# Patient Record
Sex: MALE | Race: WHITE | NOT HISPANIC OR LATINO | URBAN - METROPOLITAN AREA
[De-identification: names, ages, dates, MRNs, and addresses within clinical notes are randomized per-mention and may not be internally consistent; named-entity substitution may affect disease eponyms.]

---

## 2017-04-07 ENCOUNTER — EMERGENCY (EMERGENCY)
Facility: HOSPITAL | Age: 22
LOS: 1 days | Discharge: PSYCHIATRIC OR SUBSTANCE USE FACILITY | End: 2017-04-07
Attending: EMERGENCY MEDICINE | Admitting: EMERGENCY MEDICINE
Payer: COMMERCIAL

## 2017-04-07 ENCOUNTER — INPATIENT (INPATIENT)
Facility: HOSPITAL | Age: 22
LOS: 5 days | Discharge: HOME | End: 2017-04-13
Attending: PSYCHIATRY & NEUROLOGY | Admitting: STUDENT IN AN ORGANIZED HEALTH CARE EDUCATION/TRAINING PROGRAM
Payer: COMMERCIAL

## 2017-04-07 VITALS
OXYGEN SATURATION: 96 % | TEMPERATURE: 98 F | RESPIRATION RATE: 15 BRPM | DIASTOLIC BLOOD PRESSURE: 70 MMHG | WEIGHT: 175.05 LBS | SYSTOLIC BLOOD PRESSURE: 119 MMHG | HEART RATE: 89 BPM | HEIGHT: 69 IN

## 2017-04-07 VITALS
DIASTOLIC BLOOD PRESSURE: 68 MMHG | OXYGEN SATURATION: 98 % | RESPIRATION RATE: 16 BRPM | TEMPERATURE: 99 F | HEART RATE: 100 BPM | SYSTOLIC BLOOD PRESSURE: 125 MMHG

## 2017-04-07 VITALS — RESPIRATION RATE: 16 BRPM | HEIGHT: 68 IN | WEIGHT: 171.3 LBS | TEMPERATURE: 99 F

## 2017-04-07 DIAGNOSIS — S60.812A ABRASION OF LEFT WRIST, INITIAL ENCOUNTER: ICD-10-CM
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
## 2017-04-07 DIAGNOSIS — T14.91 SUICIDE ATTEMPT: ICD-10-CM

## 2017-04-07 DIAGNOSIS — F33.2 MAJOR DEPRESSIVE DISORDER, RECURRENT SEVERE WITHOUT PSYCHOTIC FEATURES: ICD-10-CM

## 2017-04-07 DIAGNOSIS — R00.0 TACHYCARDIA, UNSPECIFIED: ICD-10-CM

## 2017-04-07 DIAGNOSIS — S60.811A ABRASION OF RIGHT WRIST, INITIAL ENCOUNTER: ICD-10-CM

## 2017-04-07 DIAGNOSIS — F19.10 OTHER PSYCHOACTIVE SUBSTANCE ABUSE, UNCOMPLICATED: ICD-10-CM
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
## 2017-04-07 DIAGNOSIS — F17.210 NICOTINE DEPENDENCE, CIGARETTES, UNCOMPLICATED: ICD-10-CM
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
## 2017-04-07 DIAGNOSIS — Z79.899 OTHER LONG TERM (CURRENT) DRUG THERAPY: ICD-10-CM

## 2017-04-07 LAB
APAP SERPL-MCNC: <15 UG/ML — SIGNIFICANT CHANGE UP (ref 10–30)
APPEARANCE UR: CLEAR — SIGNIFICANT CHANGE UP
BASOPHILS # BLD AUTO: 0.1 K/UL — SIGNIFICANT CHANGE UP (ref 0–0.2)
BASOPHILS NFR BLD AUTO: 0.5 % — SIGNIFICANT CHANGE UP (ref 0–2)
BILIRUB UR-MCNC: NEGATIVE — SIGNIFICANT CHANGE UP
COLOR SPEC: YELLOW — SIGNIFICANT CHANGE UP
DIFF PNL FLD: ABNORMAL
EOSINOPHIL # BLD AUTO: 0.1 K/UL — SIGNIFICANT CHANGE UP (ref 0–0.5)
EOSINOPHIL NFR BLD AUTO: 1.2 % — SIGNIFICANT CHANGE UP (ref 0–6)
ETHANOL SERPL-MCNC: 42 MG/DL — HIGH (ref 0–10)
GAS PNL BLDV: SIGNIFICANT CHANGE UP
GLUCOSE UR QL: NEGATIVE — SIGNIFICANT CHANGE UP
HCT VFR BLD CALC: 41.3 % — SIGNIFICANT CHANGE UP (ref 39–50)
HGB BLD-MCNC: 13.8 G/DL — SIGNIFICANT CHANGE UP (ref 13–17)
KETONES UR-MCNC: ABNORMAL
LEUKOCYTE ESTERASE UR-ACNC: NEGATIVE — SIGNIFICANT CHANGE UP
LYMPHOCYTES # BLD AUTO: 28.6 % — SIGNIFICANT CHANGE UP (ref 13–44)
LYMPHOCYTES # BLD AUTO: 3.4 K/UL — HIGH (ref 1–3.3)
MCHC RBC-ENTMCNC: 29.4 PG — SIGNIFICANT CHANGE UP (ref 27–34)
MCHC RBC-ENTMCNC: 33.5 GM/DL — SIGNIFICANT CHANGE UP (ref 32–36)
MCV RBC AUTO: 87.6 FL — SIGNIFICANT CHANGE UP (ref 80–100)
MONOCYTES # BLD AUTO: 1 K/UL — HIGH (ref 0–0.9)
MONOCYTES NFR BLD AUTO: 8.6 % — SIGNIFICANT CHANGE UP (ref 2–14)
NEUTROPHILS # BLD AUTO: 7.3 K/UL — SIGNIFICANT CHANGE UP (ref 1.8–7.4)
NEUTROPHILS NFR BLD AUTO: 61.1 % — SIGNIFICANT CHANGE UP (ref 43–77)
NITRITE UR-MCNC: NEGATIVE — SIGNIFICANT CHANGE UP
PCP SPEC-MCNC: SIGNIFICANT CHANGE UP
PH UR: 6 — SIGNIFICANT CHANGE UP (ref 4.8–8)
PLATELET # BLD AUTO: 245 K/UL — SIGNIFICANT CHANGE UP (ref 150–400)
PROT UR-MCNC: SIGNIFICANT CHANGE UP
RBC # BLD: 4.72 M/UL — SIGNIFICANT CHANGE UP (ref 4.2–5.8)
RBC # FLD: 13.4 % — SIGNIFICANT CHANGE UP (ref 10.3–14.5)
SALICYLATES SERPL-MCNC: <2 MG/DL — LOW (ref 15–30)
SP GR SPEC: 1.03 — HIGH (ref 1.01–1.02)
TSH SERPL-MCNC: 1.73 UIU/ML — SIGNIFICANT CHANGE UP (ref 0.27–4.2)
UROBILINOGEN FLD QL: NEGATIVE — SIGNIFICANT CHANGE UP
WBC # BLD: 12 K/UL — HIGH (ref 3.8–10.5)
WBC # FLD AUTO: 12 K/UL — HIGH (ref 3.8–10.5)

## 2017-04-07 PROCEDURE — 93010 ELECTROCARDIOGRAM REPORT: CPT | Mod: NC

## 2017-04-07 PROCEDURE — 99285 EMERGENCY DEPT VISIT HI MDM: CPT | Mod: 25

## 2017-04-07 PROCEDURE — 82947 ASSAY GLUCOSE BLOOD QUANT: CPT

## 2017-04-07 PROCEDURE — 85027 COMPLETE CBC AUTOMATED: CPT

## 2017-04-07 PROCEDURE — 93005 ELECTROCARDIOGRAM TRACING: CPT

## 2017-04-07 PROCEDURE — 84295 ASSAY OF SERUM SODIUM: CPT

## 2017-04-07 PROCEDURE — 80307 DRUG TEST PRSMV CHEM ANLYZR: CPT

## 2017-04-07 PROCEDURE — 82435 ASSAY OF BLOOD CHLORIDE: CPT

## 2017-04-07 PROCEDURE — 84443 ASSAY THYROID STIM HORMONE: CPT

## 2017-04-07 PROCEDURE — 84132 ASSAY OF SERUM POTASSIUM: CPT

## 2017-04-07 PROCEDURE — 81001 URINALYSIS AUTO W/SCOPE: CPT

## 2017-04-07 PROCEDURE — 83605 ASSAY OF LACTIC ACID: CPT

## 2017-04-07 PROCEDURE — 85014 HEMATOCRIT: CPT

## 2017-04-07 PROCEDURE — 82330 ASSAY OF CALCIUM: CPT

## 2017-04-07 PROCEDURE — 99222 1ST HOSP IP/OBS MODERATE 55: CPT

## 2017-04-07 PROCEDURE — 82803 BLOOD GASES ANY COMBINATION: CPT

## 2017-04-07 PROCEDURE — 99285 EMERGENCY DEPT VISIT HI MDM: CPT

## 2017-04-07 RX ORDER — MYCOPHENOLATE MOFETIL 250 MG/1
500 CAPSULE ORAL EVERY 12 HOURS
Qty: 0 | Refills: 0 | Status: DISCONTINUED | OUTPATIENT
Start: 2017-04-07 | End: 2017-04-07

## 2017-04-07 RX ORDER — HYDROXYZINE HCL 10 MG
50 TABLET ORAL EVERY 6 HOURS
Qty: 0 | Refills: 0 | Status: DISCONTINUED | OUTPATIENT
Start: 2017-04-07 | End: 2017-04-13

## 2017-04-07 RX ORDER — DIPHENHYDRAMINE HCL 50 MG
50 CAPSULE ORAL ONCE
Qty: 0 | Refills: 0 | Status: DISCONTINUED | OUTPATIENT
Start: 2017-04-07 | End: 2017-04-13

## 2017-04-07 RX ORDER — ALPRAZOLAM 0.25 MG
0 TABLET ORAL
Qty: 0 | Refills: 0 | COMMUNITY

## 2017-04-07 RX ORDER — ACETAMINOPHEN 500 MG
1000 TABLET ORAL ONCE
Qty: 0 | Refills: 0 | Status: DISCONTINUED | OUTPATIENT
Start: 2017-04-07 | End: 2017-04-07

## 2017-04-07 RX ORDER — THIAMINE MONONITRATE (VIT B1) 100 MG
100 TABLET ORAL DAILY
Qty: 0 | Refills: 0 | Status: COMPLETED | OUTPATIENT
Start: 2017-04-07 | End: 2017-04-10

## 2017-04-07 RX ORDER — DIPHENHYDRAMINE HCL 50 MG
50 CAPSULE ORAL EVERY 6 HOURS
Qty: 0 | Refills: 0 | Status: DISCONTINUED | OUTPATIENT
Start: 2017-04-07 | End: 2017-04-13

## 2017-04-07 RX ORDER — FOLIC ACID 0.8 MG
1 TABLET ORAL DAILY
Qty: 0 | Refills: 0 | Status: DISCONTINUED | OUTPATIENT
Start: 2017-04-07 | End: 2017-04-13

## 2017-04-07 RX ORDER — HALOPERIDOL DECANOATE 100 MG/ML
5 INJECTION INTRAMUSCULAR ONCE
Qty: 0 | Refills: 0 | Status: DISCONTINUED | OUTPATIENT
Start: 2017-04-07 | End: 2017-04-13

## 2017-04-07 RX ORDER — DIPHENHYDRAMINE HCL 50 MG
50 CAPSULE ORAL AT BEDTIME
Qty: 0 | Refills: 0 | Status: DISCONTINUED | OUTPATIENT
Start: 2017-04-07 | End: 2017-04-13

## 2017-04-07 RX ORDER — SERTRALINE 25 MG/1
100 TABLET, FILM COATED ORAL DAILY
Qty: 0 | Refills: 0 | Status: DISCONTINUED | OUTPATIENT
Start: 2017-04-07 | End: 2017-04-10

## 2017-04-07 RX ORDER — HALOPERIDOL DECANOATE 100 MG/ML
5 INJECTION INTRAMUSCULAR EVERY 6 HOURS
Qty: 0 | Refills: 0 | Status: DISCONTINUED | OUTPATIENT
Start: 2017-04-07 | End: 2017-04-13

## 2017-04-07 RX ADMIN — Medication 50 MILLIGRAM(S): at 21:35

## 2017-04-07 NOTE — ED BEHAVIORAL HEALTH ASSESSMENT NOTE - DETAILS
p/w SA by cutting while alone in a hotel room; also with h/o SA by hanging and OD father with bipolar disorder and substance abuse; maternal uncle with heroin abuse and completed suicide see above hears a man's voice telling him to kill himself repeatedly "when very depressed" per pt; mother has not seen him internally preoccupied, however when bed identified mother aware

## 2017-04-07 NOTE — ED PROVIDER NOTE - OBJECTIVE STATEMENT
21 YOM PMH polysubstance abuse, multiple suicide attempts, no evaluations for past attempts, depression/anxiety on Zoloft an Xanax, no past psychiatric hospitalizations p/w depression after a suicide attempt last night.  Pt attempted to slit his wrists last night with a switch blade and was unsuccessful.  He complains of superficial abrasions on his wrists bilaterally. Mom picked him up from [place] yesterday.  Mom states PT attempted  to hang himself in the past has never been hospitalized.  Pt endorses depression, SI, access to weapons, plan, and intent.  No other complaints.  Pt endorses cocaine, marijuana, amphetamine, ETOH, and polysubstance abuse, "I use everything."  Pt took a  Xanax today.  Denies other ingestions.

## 2017-04-07 NOTE — ED BEHAVIORAL HEALTH ASSESSMENT NOTE - HPI (INCLUDE ILLNESS QUALITY, SEVERITY, DURATION, TIMING, CONTEXT, MODIFYING FACTORS, ASSOCIATED SIGNS AND SYMPTOMS)
21M single, childless, college freshman on medical FATEMEH, currently living with parents, with a history of depressive d/o, anxiety d/o, and polysubstance abuse, no prior psych admissions, at least 2 prior SA by hanging and OD, currently in OP psych tx on Zoloft, actively abusing ETOH, Xanax, and MJ, no h/o w/d, DTs, rehab, no active legal problems, no relevant PMH bib mother after SA last night by cutting.    Pt states he has been depressed as long as he remembers since age 12-13.  States he was formally diagnosed 4 months ago with depression and anxiety by an OP psychiatrist, started on Zoloft.  States he has remained significantly depressed over several months, however, with low mood, anhedonia, amotivation, and poor sleep.  This culminated in pt taking medical FATEMEH from [employer] 2 months ago and returning to  to live with parents.  States he has contemplated suicide recurrently, and this weekend took a trip to [place] to see his friends and saw an opportunity to end his life while alone in the motel room.  He cut his wrists vertically with intent to die, but then called his mother when he could not cut deep enough who drove overnight and immediately brought him to ER.  States he still wishes he was dead, but would not want to kill himself at home where his mother can find his body.  States he also hears a man's voice which tells him to kill himself, but only when he is "very depressed" and under stress.  Additionally, has been abusing Xanax, ETOH and MJ with binge pattern 3-4 times per week.  Denies H/o w/d, DT, rehab/detox.  Has very slight tremor at fingertips presently, VSS.  No otoniel/HIIP.  Significant anxiety sx reported, uses Xanax as PRN but also abuses.  Denies access to guns.     Collateral obtained from mother Jazmyne Antony who confirms above hx, feels pt is a definite threat to self for future suicide attempts if he does not get help.  States she does not think his medication is working, despite recent increase in dose to Zoloft 75mg PO daily (psychoed provided regarding dosing).  States he sleeps in bed all day typically, has been very depressed and anxious.  Believes he requires admission for safety.

## 2017-04-07 NOTE — ED BEHAVIORAL HEALTH ASSESSMENT NOTE - OTHER PAST PSYCHIATRIC HISTORY (INCLUDE DETAILS REGARDING ONSET, COURSE OF ILLNESS, INPATIENT/OUTPATIENT TREATMENT)
no prior admissions, 2 prior SA by hanging and OD, in tx with psychiatrist Dr. Clark, on Zoloft/Xanax PRN

## 2017-04-07 NOTE — ED ADULT NURSE REASSESSMENT NOTE - NS ED NURSE REASSESS COMMENT FT1
patient transferred via ambulance, vital signs stable, lung field clear. safety and support provided.

## 2017-04-07 NOTE — ED BEHAVIORAL HEALTH ASSESSMENT NOTE - RISK ASSESSMENT
Risk factors- recent SA, current SI, polysubstance, fam hx of completed suicide, decline in functioniing (left school), mood episode, ?command hallucinations    Protective factors- no guns, supportive family, good physical health    Pt is at acutely elevated risk, requires admssion for safety.

## 2017-04-07 NOTE — ED BEHAVIORAL HEALTH ASSESSMENT NOTE - SUICIDE RISK FACTORS
Hopelessness/Mood episode/Substance abuse/dependence/Unable to engage in safety planning/Global insomnia/Anhedonia/Command hallucinations to hurt self/Family history of suicide/Agitation/severe anxiety

## 2017-04-07 NOTE — ED BEHAVIORAL HEALTH ASSESSMENT NOTE - DESCRIPTION (FIRST USE, LAST USE, QUANTITY, FREQUENCY, DURATION)
binge drinking 2-3x/week, no h/o w/d, DT or rehab abuse several times a week Xanax abuse several times a week

## 2017-04-07 NOTE — ED ADULT NURSE NOTE - OBJECTIVE STATEMENT
22 Y/O AXOX4 male present in the ED with mom with chief complaint of cutting bilateral wrist with a knife with attempt of killing self .  Abrasion, strong pulses, no edema, full sensation noted in both wrist. Patient stated  " I just wanted to end it , the voice in my head are telling me to do it".  also patient stated of having previous suicidal attempt of hanging, and overdosing  with pills. At this time patient is on 1:1 OBS for safety. Capillary refill less then 3 seconds, mucous membrane moist, respiration even unlabored, lung field clear bilaterally. abdomen soft nontender, nondistended. normoactive bowel sound x4. Education provided to patient in using positive coping skills to cope with feelings. plan of care explained to patient. safety, support provided to patient.

## 2017-04-07 NOTE — ED PROVIDER NOTE - PROGRESS NOTE DETAILS
JW PT endorses attempting to hang himself recently. PT endorses SI last night with attempt to slit his wrists. PT endorses depression.  Pt endorses SI, plan, intent, access to weapons.  In spite of this  PT expresses a strong desire to return home. Explained the need for hospitalization and the risks of discharge given his suicide risk.  Pt seen by psychiatry and they agree. Pt understood and continued to refuse hospitalization today.  Given the clear and immediate danger the patient presents to himself today, patient involuntary hospitalized.  Two provider certification completed. JW Pending transfer, stable. Transferred to [facility].  Medically stabilized.

## 2017-04-07 NOTE — ED BEHAVIORAL HEALTH ASSESSMENT NOTE - PSYCHIATRIC ISSUES AND PLAN (INCLUDE STANDING AND PRN MEDICATION)
Increase Zoloft to 125mg PO daily.  Atarax 25mg PO q6h PRN anxiety.  Haldol 5mg/Benadryl 50mg PO/IM q6h PRN agitation

## 2017-04-07 NOTE — ED BEHAVIORAL HEALTH ASSESSMENT NOTE - DESCRIPTION
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

## 2017-04-07 NOTE — ED BEHAVIORAL HEALTH ASSESSMENT NOTE - SUMMARY
21M single, childless, college freshman on medical FATEMEH, currently living with parents, with a history of depressive d/o, anxiety d/o, and polysubstance abuse, no prior psych admissions, at least 2 prior SA by hanging and OD, currently in OP psych tx on Zoloft, actively abusing ETOH, Xanax, and MJ, no h/o w/d, DTs, rehab, no active legal problems, no relevant PMH bib mother after SA last night by cutting.  Pt reports he had definite intent to die with ongoing SI, inadequate response to treatment (on low dose SSRI), on medical leave from college for depression.  Family very concerned for safety.  Requires psychiatric admission for stabilization.

## 2017-04-07 NOTE — ED PROVIDER NOTE - MEDICAL DECISION MAKING DETAILS
21 YOM PMH polysubstance abuse, multiple suicide attempts, no evaluations for past attempts, depression/anxiety on Zoloft an Xanax, no past psychiatric hospitalizations p/w depression after a suicide attempt last night.  Tachycardia.  EKG WNL.  Evaluate for suicidal ideation, attempt, and possible ingestion.  Treat symptomatically.  Psychiatry consult.  Admit. 21 YOM PMH polysubstance abuse, multiple suicide attempts, no evaluations for past attempts, depression/anxiety on Zoloft an Xanax, no past psychiatric hospitalizations p/w depression after a suicide attempt last night.  Tachycardia.  EKG WNL.  Evaluate for suicidal ideation, attempt, and possible ingestion.  Treat symptomatically.  Psychiatry consult.  Admit.  --MORAIMAM

## 2017-04-08 LAB
ALBUMIN SERPL ELPH-MCNC: 4.5 G/DL — SIGNIFICANT CHANGE UP (ref 3.3–5)
ALP SERPL-CCNC: 90 U/L — SIGNIFICANT CHANGE UP (ref 60–270)
ALT FLD-CCNC: 72 U/L — HIGH (ref 4–41)
AST SERPL-CCNC: 57 U/L — HIGH (ref 4–40)
BILIRUB SERPL-MCNC: 0.6 MG/DL — SIGNIFICANT CHANGE UP (ref 0.2–1.2)
BUN SERPL-MCNC: 20 MG/DL — SIGNIFICANT CHANGE UP (ref 7–23)
CALCIUM SERPL-MCNC: 9.7 MG/DL — SIGNIFICANT CHANGE UP (ref 8.4–10.5)
CHLORIDE SERPL-SCNC: 102 MMOL/L — SIGNIFICANT CHANGE UP (ref 98–107)
CO2 SERPL-SCNC: 24 MMOL/L — SIGNIFICANT CHANGE UP (ref 22–31)
CREAT SERPL-MCNC: 1.08 MG/DL — SIGNIFICANT CHANGE UP (ref 0.5–1.3)
GLUCOSE SERPL-MCNC: 72 MG/DL — SIGNIFICANT CHANGE UP (ref 70–99)
POTASSIUM SERPL-MCNC: 4.2 MMOL/L — SIGNIFICANT CHANGE UP (ref 3.5–5.3)
POTASSIUM SERPL-SCNC: 4.2 MMOL/L — SIGNIFICANT CHANGE UP (ref 3.5–5.3)
PROT SERPL-MCNC: 7.4 G/DL — SIGNIFICANT CHANGE UP (ref 6–8.3)
SODIUM SERPL-SCNC: 144 MMOL/L — SIGNIFICANT CHANGE UP (ref 135–145)

## 2017-04-08 PROCEDURE — 99232 SBSQ HOSP IP/OBS MODERATE 35: CPT

## 2017-04-08 RX ORDER — NICOTINE POLACRILEX 2 MG
2 GUM BUCCAL
Qty: 0 | Refills: 0 | Status: DISCONTINUED | OUTPATIENT
Start: 2017-04-08 | End: 2017-04-13

## 2017-04-08 RX ADMIN — Medication 100 MILLIGRAM(S): at 09:01

## 2017-04-08 RX ADMIN — SERTRALINE 100 MILLIGRAM(S): 25 TABLET, FILM COATED ORAL at 09:01

## 2017-04-08 RX ADMIN — Medication 1 TABLET(S): at 09:01

## 2017-04-08 RX ADMIN — Medication 50 MILLIGRAM(S): at 21:52

## 2017-04-08 RX ADMIN — Medication 1 MILLIGRAM(S): at 09:01

## 2017-04-08 RX ADMIN — Medication 2 MILLIGRAM(S): at 12:53

## 2017-04-09 PROCEDURE — 99232 SBSQ HOSP IP/OBS MODERATE 35: CPT

## 2017-04-09 RX ORDER — LANOLIN ALCOHOL/MO/W.PET/CERES
3 CREAM (GRAM) TOPICAL AT BEDTIME
Qty: 0 | Refills: 0 | Status: DISCONTINUED | OUTPATIENT
Start: 2017-04-09 | End: 2017-04-12

## 2017-04-09 RX ADMIN — Medication 1 TABLET(S): at 09:26

## 2017-04-09 RX ADMIN — Medication 3 MILLIGRAM(S): at 20:36

## 2017-04-09 RX ADMIN — SERTRALINE 100 MILLIGRAM(S): 25 TABLET, FILM COATED ORAL at 09:26

## 2017-04-09 RX ADMIN — Medication 50 MILLIGRAM(S): at 20:36

## 2017-04-09 RX ADMIN — Medication 100 MILLIGRAM(S): at 09:26

## 2017-04-09 RX ADMIN — Medication 1 MILLIGRAM(S): at 09:26

## 2017-04-10 PROCEDURE — 99233 SBSQ HOSP IP/OBS HIGH 50: CPT

## 2017-04-10 RX ORDER — SERTRALINE 25 MG/1
125 TABLET, FILM COATED ORAL DAILY
Qty: 0 | Refills: 0 | Status: DISCONTINUED | OUTPATIENT
Start: 2017-04-11 | End: 2017-04-13

## 2017-04-10 RX ADMIN — Medication 3 MILLIGRAM(S): at 20:49

## 2017-04-10 RX ADMIN — Medication 100 MILLIGRAM(S): at 08:53

## 2017-04-10 RX ADMIN — Medication 1 TABLET(S): at 08:53

## 2017-04-10 RX ADMIN — Medication 1 MILLIGRAM(S): at 08:52

## 2017-04-10 RX ADMIN — SERTRALINE 100 MILLIGRAM(S): 25 TABLET, FILM COATED ORAL at 08:53

## 2017-04-10 RX ADMIN — Medication 50 MILLIGRAM(S): at 20:49

## 2017-04-11 LAB
ALBUMIN SERPL ELPH-MCNC: 4.8 G/DL — SIGNIFICANT CHANGE UP (ref 3.3–5)
ALP SERPL-CCNC: 86 U/L — SIGNIFICANT CHANGE UP (ref 60–270)
ALT FLD-CCNC: 37 U/L — SIGNIFICANT CHANGE UP (ref 4–41)
AST SERPL-CCNC: 23 U/L — SIGNIFICANT CHANGE UP (ref 4–40)
BILIRUB SERPL-MCNC: 0.6 MG/DL — SIGNIFICANT CHANGE UP (ref 0.2–1.2)
BUN SERPL-MCNC: 14 MG/DL — SIGNIFICANT CHANGE UP (ref 7–23)
CALCIUM SERPL-MCNC: 9.4 MG/DL — SIGNIFICANT CHANGE UP (ref 8.4–10.5)
CHLORIDE SERPL-SCNC: 105 MMOL/L — SIGNIFICANT CHANGE UP (ref 98–107)
CO2 SERPL-SCNC: 22 MMOL/L — SIGNIFICANT CHANGE UP (ref 22–31)
CREAT SERPL-MCNC: 1.1 MG/DL — SIGNIFICANT CHANGE UP (ref 0.5–1.3)
GLUCOSE SERPL-MCNC: 86 MG/DL — SIGNIFICANT CHANGE UP (ref 70–99)
POTASSIUM SERPL-MCNC: 3.8 MMOL/L — SIGNIFICANT CHANGE UP (ref 3.5–5.3)
POTASSIUM SERPL-SCNC: 3.8 MMOL/L — SIGNIFICANT CHANGE UP (ref 3.5–5.3)
PROT SERPL-MCNC: 7.9 G/DL — SIGNIFICANT CHANGE UP (ref 6–8.3)
SODIUM SERPL-SCNC: 146 MMOL/L — HIGH (ref 135–145)

## 2017-04-11 PROCEDURE — 99232 SBSQ HOSP IP/OBS MODERATE 35: CPT

## 2017-04-11 RX ORDER — BACITRACIN ZINC 500 UNIT/G
1 OINTMENT IN PACKET (EA) TOPICAL
Qty: 0 | Refills: 0 | Status: DISCONTINUED | OUTPATIENT
Start: 2017-04-11 | End: 2017-04-13

## 2017-04-11 RX ADMIN — Medication 1 MILLIGRAM(S): at 08:44

## 2017-04-11 RX ADMIN — Medication 1 APPLICATION(S): at 21:07

## 2017-04-11 RX ADMIN — Medication 50 MILLIGRAM(S): at 21:07

## 2017-04-11 RX ADMIN — Medication 3 MILLIGRAM(S): at 21:07

## 2017-04-11 RX ADMIN — SERTRALINE 125 MILLIGRAM(S): 25 TABLET, FILM COATED ORAL at 08:44

## 2017-04-11 RX ADMIN — Medication 1 TABLET(S): at 08:44

## 2017-04-12 PROCEDURE — 99232 SBSQ HOSP IP/OBS MODERATE 35: CPT

## 2017-04-12 RX ORDER — TRAZODONE HCL 50 MG
1 TABLET ORAL
Qty: 15 | Refills: 0 | OUTPATIENT
Start: 2017-04-12 | End: 2017-04-27

## 2017-04-12 RX ORDER — BACITRACIN ZINC 500 UNIT/G
1 OINTMENT IN PACKET (EA) TOPICAL
Qty: 1 | Refills: 0 | OUTPATIENT
Start: 2017-04-12 | End: 2017-04-27

## 2017-04-12 RX ORDER — SERTRALINE 25 MG/1
0 TABLET, FILM COATED ORAL
Qty: 0 | Refills: 0 | COMMUNITY

## 2017-04-12 RX ORDER — TRAZODONE HCL 50 MG
50 TABLET ORAL AT BEDTIME
Qty: 0 | Refills: 0 | Status: DISCONTINUED | OUTPATIENT
Start: 2017-04-12 | End: 2017-04-13

## 2017-04-12 RX ORDER — SERTRALINE 25 MG/1
5 TABLET, FILM COATED ORAL
Qty: 75 | Refills: 0 | OUTPATIENT
Start: 2017-04-12 | End: 2017-04-27

## 2017-04-12 RX ADMIN — SERTRALINE 125 MILLIGRAM(S): 25 TABLET, FILM COATED ORAL at 09:33

## 2017-04-12 RX ADMIN — Medication 50 MILLIGRAM(S): at 21:02

## 2017-04-12 RX ADMIN — Medication 50 MILLIGRAM(S): at 21:01

## 2017-04-12 RX ADMIN — Medication 1 TABLET(S): at 09:33

## 2017-04-12 RX ADMIN — Medication 1 APPLICATION(S): at 21:01

## 2017-04-12 RX ADMIN — Medication 1 MILLIGRAM(S): at 09:33

## 2017-04-13 VITALS — RESPIRATION RATE: 20 BRPM | TEMPERATURE: 98 F

## 2017-04-13 LAB
ALBUMIN SERPL ELPH-MCNC: 4.6 G/DL — SIGNIFICANT CHANGE UP (ref 3.3–5)
ALP SERPL-CCNC: 83 U/L — SIGNIFICANT CHANGE UP (ref 60–270)
ALT FLD-CCNC: 27 U/L — SIGNIFICANT CHANGE UP (ref 4–41)
AST SERPL-CCNC: 23 U/L — SIGNIFICANT CHANGE UP (ref 4–40)
BILIRUB SERPL-MCNC: 0.4 MG/DL — SIGNIFICANT CHANGE UP (ref 0.2–1.2)
BUN SERPL-MCNC: 19 MG/DL — SIGNIFICANT CHANGE UP (ref 7–23)
CALCIUM SERPL-MCNC: 9.4 MG/DL — SIGNIFICANT CHANGE UP (ref 8.4–10.5)
CHLORIDE SERPL-SCNC: 106 MMOL/L — SIGNIFICANT CHANGE UP (ref 98–107)
CO2 SERPL-SCNC: 22 MMOL/L — SIGNIFICANT CHANGE UP (ref 22–31)
CREAT SERPL-MCNC: 1.12 MG/DL — SIGNIFICANT CHANGE UP (ref 0.5–1.3)
GLUCOSE SERPL-MCNC: 83 MG/DL — SIGNIFICANT CHANGE UP (ref 70–99)
HCT VFR BLD CALC: 44.3 % — SIGNIFICANT CHANGE UP (ref 39–50)
HGB BLD-MCNC: 14.7 G/DL — SIGNIFICANT CHANGE UP (ref 13–17)
MCHC RBC-ENTMCNC: 29.2 PG — SIGNIFICANT CHANGE UP (ref 27–34)
MCHC RBC-ENTMCNC: 33.2 % — SIGNIFICANT CHANGE UP (ref 32–36)
MCV RBC AUTO: 88.1 FL — SIGNIFICANT CHANGE UP (ref 80–100)
PLATELET # BLD AUTO: 263 K/UL — SIGNIFICANT CHANGE UP (ref 150–400)
PMV BLD: 9.6 FL — SIGNIFICANT CHANGE UP (ref 7–13)
POTASSIUM SERPL-MCNC: 4.2 MMOL/L — SIGNIFICANT CHANGE UP (ref 3.5–5.3)
POTASSIUM SERPL-SCNC: 4.2 MMOL/L — SIGNIFICANT CHANGE UP (ref 3.5–5.3)
PROT SERPL-MCNC: 7.4 G/DL — SIGNIFICANT CHANGE UP (ref 6–8.3)
RBC # BLD: 5.03 M/UL — SIGNIFICANT CHANGE UP (ref 4.2–5.8)
RBC # FLD: 14.2 % — SIGNIFICANT CHANGE UP (ref 10.3–14.5)
SODIUM SERPL-SCNC: 143 MMOL/L — SIGNIFICANT CHANGE UP (ref 135–145)
WBC # BLD: 7.82 K/UL — SIGNIFICANT CHANGE UP (ref 3.8–10.5)
WBC # FLD AUTO: 7.82 K/UL — SIGNIFICANT CHANGE UP (ref 3.8–10.5)

## 2017-04-13 PROCEDURE — 99239 HOSP IP/OBS DSCHRG MGMT >30: CPT

## 2017-04-13 RX ADMIN — Medication 1 TABLET(S): at 09:50

## 2017-04-13 RX ADMIN — SERTRALINE 125 MILLIGRAM(S): 25 TABLET, FILM COATED ORAL at 09:50

## 2017-04-13 RX ADMIN — Medication 1 MILLIGRAM(S): at 09:50

## 2017-05-16 NOTE — ED BEHAVIORAL HEALTH ASSESSMENT NOTE - DIFFERENTIAL
ua, xr, reassessment. Will need to ambulate if xray neg.
MDD severe, polsubstance abuse, r/o bipolar (by family hx)

## 2020-12-11 NOTE — ED ADULT NURSE NOTE - NS ED NURSE LEVEL OF CONSCIOUSNESS ORIENTATION
Patient attended Phase 2 Cardiac Rehab Exercise Session. Further documentation will be scanned into the medical record upon discharge.   Oriented - self; Oriented - place; Oriented - time